# Patient Record
Sex: MALE | Race: WHITE
[De-identification: names, ages, dates, MRNs, and addresses within clinical notes are randomized per-mention and may not be internally consistent; named-entity substitution may affect disease eponyms.]

---

## 2017-02-23 ENCOUNTER — HOSPITAL ENCOUNTER (EMERGENCY)
Dept: HOSPITAL 17 - PHEFT | Age: 42
Discharge: HOME | End: 2017-02-23
Payer: MEDICARE

## 2017-02-23 VITALS
HEART RATE: 67 BPM | RESPIRATION RATE: 16 BRPM | TEMPERATURE: 98.8 F | SYSTOLIC BLOOD PRESSURE: 120 MMHG | DIASTOLIC BLOOD PRESSURE: 85 MMHG | OXYGEN SATURATION: 100 %

## 2017-02-23 VITALS — BODY MASS INDEX: 27.26 KG/M2 | WEIGHT: 201.28 LBS | HEIGHT: 72 IN

## 2017-02-23 DIAGNOSIS — Z72.0: ICD-10-CM

## 2017-02-23 DIAGNOSIS — Z23: ICD-10-CM
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
## 2017-02-23 DIAGNOSIS — S91.312A: Primary | ICD-10-CM

## 2017-02-23 PROCEDURE — 90714 TD VACC NO PRESV 7 YRS+ IM: CPT

## 2017-02-23 PROCEDURE — 73630 X-RAY EXAM OF FOOT: CPT

## 2017-02-23 PROCEDURE — 90471 IMMUNIZATION ADMIN: CPT

## 2017-02-23 PROCEDURE — 12001 RPR S/N/AX/GEN/TRNK 2.5CM/<: CPT
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
## 2018-03-30 ENCOUNTER — HOSPITAL ENCOUNTER (EMERGENCY)
Dept: HOSPITAL 17 - PHED | Age: 43
Discharge: HOME | End: 2018-03-30
Payer: MEDICARE

## 2018-03-30 VITALS
TEMPERATURE: 99.5 F | DIASTOLIC BLOOD PRESSURE: 57 MMHG | RESPIRATION RATE: 18 BRPM | OXYGEN SATURATION: 97 % | HEART RATE: 85 BPM | SYSTOLIC BLOOD PRESSURE: 108 MMHG

## 2018-03-30 VITALS
DIASTOLIC BLOOD PRESSURE: 67 MMHG | HEART RATE: 84 BPM | OXYGEN SATURATION: 95 % | TEMPERATURE: 100.3 F | RESPIRATION RATE: 16 BRPM | SYSTOLIC BLOOD PRESSURE: 124 MMHG

## 2018-03-30 VITALS — WEIGHT: 211.64 LBS | BODY MASS INDEX: 28.67 KG/M2 | HEIGHT: 72 IN

## 2018-03-30 DIAGNOSIS — L03.113: ICD-10-CM
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
## 2018-03-30 DIAGNOSIS — S63.511A: Primary | ICD-10-CM

## 2018-03-30 PROCEDURE — 73110 X-RAY EXAM OF WRIST: CPT

## 2018-03-30 PROCEDURE — 99283 EMERGENCY DEPT VISIT LOW MDM: CPT

## 2018-03-30 NOTE — PD
HPI


Chief Complaint:  Injury


Time Seen by Provider:  11:01


Travel History


International Travel<30 days:  No


Contact w/Intl Traveler<30days:  No


Traveled to known affect area:  No





History of Present Illness


HPI


42-year-old male accidentally struck the dorsal aspect of his right hand 

against a truck while performing repairs.  Persistent pain leading to today's 

evaluation reported.  No fever.  Pain is worse with range of motion and 

pressure.





[facility]


Past Medical History


Arthritis:  Yes (psoriatic)





Past Surgical History


Joint Replacement:  Yes (total left knee )





Social History


Alcohol Use:  Yes (rare)


Tobacco Use:  No


Substance Use:  No





Allergies-Medications


(Allergen,Severity, Reaction):  


Coded Allergies:  


     No Known Allergies (Unverified  Adverse Reaction, Unknown, 3/30/18)


Reported Meds & Prescriptions





Reported Meds & Active Scripts


Active


Bactrim DS (Sulfamethoxazole-Trimethoprim) 800-160 Mg Tab 1 Tab PO BID


Reported


Enbrel Sureclick PF Inj (Etanercept) 50 Mg/Ml Syr 50 Mg SQ Q7D


Prednisone 2.5 Mg Tab 2.5 Mg PO BID


Folic Acid 400 Mcg Tab 400 Mcg PO SUMOTUWETHSA


Methotrexate Inj 50 Mg/2 Ml Inj 0.8 Ml INJ FR








Review of Systems


General / Constitutional:  No: Fever


Eyes:  No: Diploplia


HENT:  No: Headaches


Respiratory:  No: Cough


Musculoskeletal:  Positive: Pain


Skin:  Positive Rash





Physical Exam


Narrative


GENERAL: 43 yo M, WNWD





Vital Signs








  Date Time  Temp Pulse Resp B/P (MAP) Pulse Ox O2 Delivery O2 Flow Rate FiO2


 


3/30/18 10:59 99.5 85 18 108/57 (74) 97 Room Air  


 


3/30/18 09:08 100.3 84 16 124/67 (86) 95   








SKIN: Erythema, tenderness, warmth.  There are 2 lesions on the dorsum of the 

hand both are very superficial linear lacerations each measuring about 1.5 cm x 

1 mm somewhat irregular in configuration.


HEAD: Normocephalic.


EYES: No scleral icterus. No injection or drainage. 


NECK: Supple, trachea midline. No JVD or lymphadenopathy.


CARDIOVASCULAR: Regular rate and rhythm without murmurs, gallops, or rubs. 


RESPIRATORY: Breath sounds equal bilaterally. No accessory muscle use.


GASTROINTESTINAL: Abdomen soft, non-tender, nondistended. 


MUSCULOSKELETAL: No cyanosis, or edema.  Generalized swelling from the distal 

radioulnar articulation to the proximal phalanges of the right hand.  2+ radial 

artery pulses bilaterally.  Snuffbox tenderness present on the right side.


BACK: Nontender without obvious deformity. No CVA tenderness.








Data


Data


Last Documented VS





Vital Signs








  Date Time  Temp Pulse Resp B/P (MAP) Pulse Ox O2 Delivery O2 Flow Rate FiO2


 


3/30/18 10:59 99.5 85 18 108/57 (74) 97 Room Air  








Orders





 Orders


Wrist, Complete ([id number]) (3/30/18 09:10)


^ Splint (3/30/18 11:14)


Ed Discharge Order (3/30/18 11:14)


Ibuprofen (Motrin) (3/30/18 11:15)


Ondansetron  Odt (Zofran  Odt) (3/30/18 11:30)








MDM


Medical Decision Making


Medical Screen Exam Complete:  Yes


Emergency Medical Condition:  Yes


Medical Record Reviewed:  Yes


Differential Diagnosis


Scaphoid fracture, cellulitis, contusion, radioulnar joint trauma


Narrative Course


Thumb spica


Follow-up with hand surgery 2 weeks rule out scaphoid injury. 


Patient verbalized understanding and agreement follow-up as described.


Trace cellulitis about the dorsum of the hand Bactrim prescribed


Return precautions discussed





Diagnosis





 Primary Impression:  


 Carpal joint sprain


 Qualified Codes:  S63.511A - Sprain of carpal joint of right wrist, initial 

encounter


 Additional Impression:  


 Cellulitis of hand, right


Referrals:  


Marcell Villavicencio MD


call for appointment


Hand surgeon.


Call for appointment for repeat hand xray in two weeks to evaluation for 

scaphoid bone fracture.





Annalee Granado MD


call for appointment


Dr Granado is also a hand surgeon. If Dr Villavicencio can't fit your in please call 

Dr Granado.


Call for appointment for repeat hand xray in two weeks to evaluation for 

scaphoid bone fracture.


***Med/Other Pt SpecificInfo:  Prescription(s) given


Scripts


Sulfamethoxazole-Trimethoprim (Bactrim DS) 800-160 Mg Tab


1 TAB PO BID for Infection, #20 TAB 0 Refills


   Prov: Francois Pillai MD         3/30/18


Disposition:  01 DISCHARGE HOME


Condition:  Stable











Francois Pillai MD Mar 30, 2018 11:18

## 2018-03-30 NOTE — RADRPT
EXAM DATE/TIME:  03/30/2018 09:23 

 

[facility] COMPARISON:     

No previous studies available for comparison.

 

                     

INDICATIONS :     

Right wrist pain, injured yesterday working on an engine.

                     

 

MEDICAL HISTORY :     

None.          

 

SURGICAL HISTORY :     

None.   

 

ENCOUNTER:     

Initial                                        

 

ACUITY:     

2 days      

 

PAIN SCORE:     

8/10

 

LOCATION:     

Right  wrist

 

FINDINGS:     

Three view examination of the right wrist demonstrates no soft tissue swelling, dislocation, or fract
ure.  The carpal bones are in normal alignment.  The joint spaces are maintained.  Bony mineralizatio
n is normal.

 

CONCLUSION:     Negative trauma study. 

 

 

 

 Blue Fournier MD on March 30, 2018 at 9:56           

Board Certified Radiologist.

 This report was verified electronically.